# Patient Record
Sex: FEMALE | Race: BLACK OR AFRICAN AMERICAN | NOT HISPANIC OR LATINO | Employment: FULL TIME | URBAN - METROPOLITAN AREA
[De-identification: names, ages, dates, MRNs, and addresses within clinical notes are randomized per-mention and may not be internally consistent; named-entity substitution may affect disease eponyms.]

---

## 2017-12-02 ENCOUNTER — HOSPITAL ENCOUNTER (EMERGENCY)
Facility: HOSPITAL | Age: 37
Discharge: HOME | End: 2017-12-02
Attending: EMERGENCY MEDICINE
Payer: COMMERCIAL

## 2017-12-02 VITALS
TEMPERATURE: 99 F | BODY MASS INDEX: 36.8 KG/M2 | HEART RATE: 68 BPM | DIASTOLIC BLOOD PRESSURE: 81 MMHG | WEIGHT: 200 LBS | RESPIRATION RATE: 18 BRPM | OXYGEN SATURATION: 100 % | SYSTOLIC BLOOD PRESSURE: 131 MMHG | HEIGHT: 62 IN

## 2017-12-02 DIAGNOSIS — Z72.0 TOBACCO USER: ICD-10-CM

## 2017-12-02 DIAGNOSIS — M54.9 BACK PAIN, UNSPECIFIED BACK LOCATION, UNSPECIFIED BACK PAIN LATERALITY, UNSPECIFIED CHRONICITY: Primary | ICD-10-CM

## 2017-12-02 DIAGNOSIS — R03.0 ELEVATED BLOOD PRESSURE READING: ICD-10-CM

## 2017-12-02 PROCEDURE — 63600175 PHARM REV CODE 636 W HCPCS: Performed by: EMERGENCY MEDICINE

## 2017-12-02 PROCEDURE — 25000003 PHARM REV CODE 250: Performed by: EMERGENCY MEDICINE

## 2017-12-02 PROCEDURE — 99283 EMERGENCY DEPT VISIT LOW MDM: CPT | Mod: 25

## 2017-12-02 PROCEDURE — 96372 THER/PROPH/DIAG INJ SC/IM: CPT

## 2017-12-02 RX ORDER — CYCLOBENZAPRINE HCL 10 MG
10 TABLET ORAL
Status: COMPLETED | OUTPATIENT
Start: 2017-12-02 | End: 2017-12-02

## 2017-12-02 RX ORDER — KETOROLAC TROMETHAMINE 10 MG/1
10 TABLET, FILM COATED ORAL
Status: COMPLETED | OUTPATIENT
Start: 2017-12-02 | End: 2017-12-02

## 2017-12-02 RX ORDER — CYCLOBENZAPRINE HCL 10 MG
10 TABLET ORAL 3 TIMES DAILY PRN
Qty: 30 TABLET | Refills: 0 | OUTPATIENT
Start: 2017-12-02 | End: 2023-08-07

## 2017-12-02 RX ORDER — ONDANSETRON 4 MG/1
4 TABLET, ORALLY DISINTEGRATING ORAL
Status: COMPLETED | OUTPATIENT
Start: 2017-12-02 | End: 2017-12-02

## 2017-12-02 RX ORDER — MORPHINE SULFATE 2 MG/ML
4 INJECTION, SOLUTION INTRAMUSCULAR; INTRAVENOUS
Status: COMPLETED | OUTPATIENT
Start: 2017-12-02 | End: 2017-12-02

## 2017-12-02 RX ORDER — IBUPROFEN 600 MG/1
600 TABLET ORAL EVERY 6 HOURS PRN
Qty: 40 TABLET | Refills: 0 | OUTPATIENT
Start: 2017-12-02 | End: 2023-08-07

## 2017-12-02 RX ORDER — HYDROCODONE BITARTRATE AND ACETAMINOPHEN 7.5; 325 MG/1; MG/1
1 TABLET ORAL EVERY 6 HOURS PRN
Qty: 16 TABLET | Refills: 0 | OUTPATIENT
Start: 2017-12-02 | End: 2023-08-07

## 2017-12-02 RX ADMIN — ONDANSETRON 4 MG: 4 TABLET, ORALLY DISINTEGRATING ORAL at 08:12

## 2017-12-02 RX ADMIN — KETOROLAC TROMETHAMINE 10 MG: 10 TABLET, FILM COATED ORAL at 08:12

## 2017-12-02 RX ADMIN — CYCLOBENZAPRINE HYDROCHLORIDE 10 MG: 10 TABLET, FILM COATED ORAL at 08:12

## 2017-12-02 RX ADMIN — Medication 4 MG: at 08:12

## 2017-12-03 NOTE — DISCHARGE INSTRUCTIONS
Your blood pressure was elevated in the ED.  You may have high blood pressure.   Keep a log of your blood pressure and follow up with a Primary Care Provider within the next two weeks. Call [phone] for appointment.

## 2017-12-03 NOTE — ED NOTES
Pt is aaoX4, resp e/u, skin warm and dry, nad. Pt is aware of poc, and she denies having any further questions or concerns at this time. Pt will be discharged per md order.

## 2017-12-03 NOTE — ED PROVIDER NOTES
Encounter Date: 12/2/2017       History     Chief Complaint   Patient presents with    Back Pain     Mid lower back pain X2 days worsening tonight.      CHIEF COMPLIANT: Back Pain (Mid lower back pain X2 days worsening tonight. )       12/2/2017, 8:06 PM     The history is provided by the patient. Suzie Terrell is a 36 y.o. female presenting to the ED for low back pain.  Patient reports that she had picked up on 3 cases of water and placed in her car.  She states that she started having low back pain that was midline gradual in onset 2-3 days ago.  It is worsened by coughing, movement, laughing.  It is described as stabbing.  It is rated 10 out of 10.  There is no radiation of the pain.  Patient denies any IV drug use, dialysis, night sweats, unexpected weight loss, perirectal paresthesia, numbness or weakness to one side, abdominal pain, dysuria, urgency, or frequency or chest pain.  Prior treatment includes ibuprofen as well as baclofen.    PCP: Willard Geiger MD  Specialist:             Review of patient's allergies indicates:  No Known Allergies  Past Medical History:   Diagnosis Date    Arthritis      Past Surgical History:   Procedure Laterality Date    BONE CYST EXCISION  left wrist     History reviewed. No pertinent family history.  Social History   Substance Use Topics    Smoking status: Current Some Day Smoker    Smokeless tobacco: Not on file    Alcohol use No     Review of Systems   Constitutional: Negative for fever.   HENT: Negative for sore throat.    Respiratory: Negative for shortness of breath.    Cardiovascular: Negative for chest pain.   Gastrointestinal: Negative for nausea.   Genitourinary: Negative for dysuria, flank pain, hematuria and urgency.   Musculoskeletal: Negative for gait problem, joint swelling and neck stiffness.   Skin: Negative for rash.   Neurological: Negative for weakness.   Hematological: Does not bruise/bleed easily.       Physical Exam     Initial Vitals  "[12/02/17 1944]   BP Pulse Resp Temp SpO2   (!) 134/96 75 20 98.8 °F (37.1 °C) 100 %      MAP       108.67         Vitals:    12/02/17 1944   BP: (!) 134/96   Pulse: 75   Resp: 20   Temp: 98.8 °F (37.1 °C)   TempSrc: Oral   SpO2: 100%   Weight: 90.7 kg (200 lb)   Height: 5' 2" (1.575 m)         Physical Exam    Nursing note and vitals reviewed.  Constitutional: She appears well-developed and well-nourished.   HENT:   Head: Normocephalic and atraumatic.   Eyes: Conjunctivae and EOM are normal. Pupils are equal, round, and reactive to light.   Neck: Normal range of motion.   Cardiovascular: Normal rate, regular rhythm and normal heart sounds.   Pulmonary/Chest: Breath sounds normal. No respiratory distress.   Abdominal: Soft. Bowel sounds are normal. She exhibits no mass. There is no rebound and no guarding.   Musculoskeletal:        Thoracic back: She exhibits no tenderness, no bony tenderness and no deformity.        Lumbar back: She exhibits decreased range of motion, pain and spasm. She exhibits no tenderness and no bony tenderness.        Back:    Sensation intact and toes.  Cap refill less than 2 seconds.    Plantar flexion/dorsiflexion +5 out of 5, equal bilaterally.  Knee extension and knee flexion +5 out of 5 equal bilaterally.  Hip abduction/adduction +5 out of 5 equal bilaterally.  Hip extension/hip flexion +5 out of 5 equal bilaterally.    Negative straight leg raise bilaterally.   Neurological: She is alert and oriented to person, place, and time. She has normal strength. No cranial nerve deficit.   Cranial nerves II-XII intact   Skin: Skin is warm and dry.   Psychiatric: She has a normal mood and affect. Her speech is normal and behavior is normal. Thought content normal.         ED Course   Procedures  Labs Reviewed - No data to display                            ED Course      Medications   ketorolac tablet 10 mg (10 mg Oral Given 12/2/17 2033)   cyclobenzaprine tablet 10 mg (10 mg Oral Given 12/2/17 " 2034)   ondansetron disintegrating tablet 4 mg (4 mg Oral Given 12/2/17 2034)   morphine injection 4 mg (4 mg Intramuscular Given 12/2/17 2034)       9:17 PM Reassessment: Dr. Rockwell reassessed the pt.  The pt is resting comfortably and is NAD.  Pt states their sx have improved. Discussed test results, shared treatment plan, specific conditions for return, and the need for f/u.  Answered their questions at this time.  Pt understands and agrees to the plan.  The pt has remained hemodynamically stable through ED course and is stable for discharge.     Follow-up Information     Willard Geiger MD. Schedule an appointment as soon as possible for a visit in 2 days.    Specialty:  Family Medicine  Why:  Warm compresses to back.  Return to the ED for:  loss of control of bowel/bladder, new numbness/weakness of the legs, abdominal pain, night sweats or other concerns.  Contact information:  [address] [facility] [id number] [phone]                     New Prescriptions    CYCLOBENZAPRINE (FLEXERIL) 10 MG TABLET    Take 1 tablet (10 mg total) by mouth 3 (three) times daily as needed for Muscle spasms.    HYDROCODONE-ACETAMINOPHEN 7.5-325MG (NORCO) 7.5-325 MG PER TABLET    Take 1 tablet by mouth every 6 (six) hours as needed.    IBUPROFEN (ADVIL,MOTRIN) 600 MG TABLET    Take 1 tablet (600 mg total) by mouth every 6 (six) hours as needed.        Discontinued Medications    HYDROCODONE-ACETAMINOPHEN 5-325MG (NORCO) 5-325 MG PER TABLET    Take 1 tablet by mouth every 6 (six) hours as needed for Pain.    NAPROXEN (NAPROSYN) 500 MG TABLET    Take 1 tablet (500 mg total) by mouth 2 (two) times daily with meals.         Pre-hypertension/Hypertension: The pt has been informed that they may have pre-hypertension or hypertension based on a blood pressure reading in the ED. I recommend that the pt call the PCP listed on their discharge instructions or a physician of their choice this week to arrange  f/u for further evaluation of possible pre-hypertension or hypertension.     Clinical Impression:       ICD-10-CM ICD-9-CM   1. Back pain, unspecified back location, unspecified back pain laterality, unspecified chronicity M54.9 724.5   2. Elevated blood pressure reading R03.0 796.2   3. Tobacco user Z72.0 305.1         Disposition:   Disposition: Discharged  Condition: Stable                        Pema Rockwell, DO  12/02/17 2629

## 2020-03-11 PROBLEM — M19.90 ARTHRITIS: Status: ACTIVE | Noted: 2020-03-11

## 2020-03-11 PROBLEM — R35.89 FREQUENCY OF URINATION AND POLYURIA: Status: ACTIVE | Noted: 2020-03-11

## 2020-03-11 PROBLEM — R35.0 FREQUENCY OF URINATION AND POLYURIA: Status: ACTIVE | Noted: 2020-03-11

## 2023-08-07 ENCOUNTER — HOSPITAL ENCOUNTER (EMERGENCY)
Facility: HOSPITAL | Age: 43
Discharge: HOME | End: 2023-08-07
Attending: EMERGENCY MEDICINE
Payer: COMMERCIAL

## 2023-08-07 VITALS
SYSTOLIC BLOOD PRESSURE: 133 MMHG | DIASTOLIC BLOOD PRESSURE: 80 MMHG | WEIGHT: 210 LBS | HEART RATE: 74 BPM | RESPIRATION RATE: 18 BRPM | BODY MASS INDEX: 38.41 KG/M2 | OXYGEN SATURATION: 99 %

## 2023-08-07 DIAGNOSIS — R06.02 SOB (SHORTNESS OF BREATH): ICD-10-CM

## 2023-08-07 DIAGNOSIS — N39.0 ACUTE UTI (URINARY TRACT INFECTION): ICD-10-CM

## 2023-08-07 DIAGNOSIS — F41.0 ANXIETY ATTACK: ICD-10-CM

## 2023-08-07 DIAGNOSIS — U07.1 COVID-19 VIRUS INFECTION: Primary | ICD-10-CM

## 2023-08-07 LAB
ALBUMIN SERPL BCP-MCNC: 3.8 G/DL (ref 3.5–5.2)
ALP SERPL-CCNC: 69 U/L (ref 55–135)
ALT SERPL W/O P-5'-P-CCNC: 27 U/L (ref 10–44)
ANION GAP SERPL CALC-SCNC: 15 MMOL/L (ref 8–16)
AST SERPL-CCNC: 34 U/L (ref 10–40)
B-HCG UR QL: NEGATIVE
BACTERIA #/AREA URNS AUTO: ABNORMAL /HPF
BASOPHILS # BLD AUTO: 0.03 K/UL (ref 0–0.2)
BASOPHILS NFR BLD: 0.5 % (ref 0–1.9)
BILIRUB SERPL-MCNC: 0.3 MG/DL (ref 0.1–1)
BILIRUB UR QL STRIP: NEGATIVE
BNP SERPL-MCNC: <10 PG/ML (ref 0–99)
BUN SERPL-MCNC: 13 MG/DL (ref 6–20)
CALCIUM SERPL-MCNC: 8.8 MG/DL (ref 8.7–10.5)
CHLORIDE SERPL-SCNC: 105 MMOL/L (ref 95–110)
CLARITY UR REFRACT.AUTO: ABNORMAL
CO2 SERPL-SCNC: 18 MMOL/L (ref 23–29)
COLOR UR AUTO: YELLOW
CREAT SERPL-MCNC: 1 MG/DL (ref 0.5–1.4)
D DIMER PPP IA.FEU-MCNC: 0.51 MG/L FEU
DIFFERENTIAL METHOD: ABNORMAL
EOSINOPHIL # BLD AUTO: 0.1 K/UL (ref 0–0.5)
EOSINOPHIL NFR BLD: 1.7 % (ref 0–8)
ERYTHROCYTE [DISTWIDTH] IN BLOOD BY AUTOMATED COUNT: 16.7 % (ref 11.5–14.5)
EST. GFR  (NO RACE VARIABLE): >60 ML/MIN/1.73 M^2
GLUCOSE SERPL-MCNC: 89 MG/DL (ref 70–110)
GLUCOSE UR QL STRIP: NEGATIVE
HCT VFR BLD AUTO: 34.2 % (ref 37–48.5)
HGB BLD-MCNC: 11.2 G/DL (ref 12–16)
HGB UR QL STRIP: NEGATIVE
HYALINE CASTS UR QL AUTO: 8 /LPF
IMM GRANULOCYTES # BLD AUTO: 0.01 K/UL (ref 0–0.04)
IMM GRANULOCYTES NFR BLD AUTO: 0.2 % (ref 0–0.5)
KETONES UR QL STRIP: ABNORMAL
LEUKOCYTE ESTERASE UR QL STRIP: ABNORMAL
LYMPHOCYTES # BLD AUTO: 2.4 K/UL (ref 1–4.8)
LYMPHOCYTES NFR BLD: 38.4 % (ref 18–48)
MCH RBC QN AUTO: 24.3 PG (ref 27–31)
MCHC RBC AUTO-ENTMCNC: 32.7 G/DL (ref 32–36)
MCV RBC AUTO: 74 FL (ref 82–98)
MICROSCOPIC COMMENT: ABNORMAL
MONOCYTES # BLD AUTO: 0.7 K/UL (ref 0.3–1)
MONOCYTES NFR BLD: 10.6 % (ref 4–15)
NEUTROPHILS # BLD AUTO: 3.1 K/UL (ref 1.8–7.7)
NEUTROPHILS NFR BLD: 48.6 % (ref 38–73)
NITRITE UR QL STRIP: NEGATIVE
NRBC BLD-RTO: 0 /100 WBC
PH UR STRIP: 6 [PH] (ref 5–8)
PLATELET # BLD AUTO: 349 K/UL (ref 150–450)
PMV BLD AUTO: 8.8 FL (ref 9.2–12.9)
POTASSIUM SERPL-SCNC: 3.4 MMOL/L (ref 3.5–5.1)
PROT SERPL-MCNC: 8.2 G/DL (ref 6–8.4)
PROT UR QL STRIP: ABNORMAL
RBC # BLD AUTO: 4.61 M/UL (ref 4–5.4)
RBC #/AREA URNS AUTO: 7 /HPF (ref 0–4)
SODIUM SERPL-SCNC: 138 MMOL/L (ref 136–145)
SP GR UR STRIP: 1.02 (ref 1–1.03)
SQUAMOUS #/AREA URNS AUTO: 15 /HPF
TROPONIN I SERPL DL<=0.01 NG/ML-MCNC: 0.01 NG/ML (ref 0–0.03)
URN SPEC COLLECT METH UR: ABNORMAL
UROBILINOGEN UR STRIP-ACNC: <2 EU/DL
WBC # BLD AUTO: 6.33 K/UL (ref 3.9–12.7)
WBC #/AREA URNS AUTO: 18 /HPF (ref 0–5)

## 2023-08-07 PROCEDURE — 83880 ASSAY OF NATRIURETIC PEPTIDE: CPT | Mod: ER | Performed by: EMERGENCY MEDICINE

## 2023-08-07 PROCEDURE — 99285 EMERGENCY DEPT VISIT HI MDM: CPT | Mod: 25,ER

## 2023-08-07 PROCEDURE — 80053 COMPREHEN METABOLIC PANEL: CPT | Mod: ER | Performed by: EMERGENCY MEDICINE

## 2023-08-07 PROCEDURE — 63600175 PHARM REV CODE 636 W HCPCS: Mod: ER | Performed by: EMERGENCY MEDICINE

## 2023-08-07 PROCEDURE — 84484 ASSAY OF TROPONIN QUANT: CPT | Mod: ER | Performed by: EMERGENCY MEDICINE

## 2023-08-07 PROCEDURE — 93010 ELECTROCARDIOGRAM REPORT: CPT | Mod: ,,, | Performed by: INTERNAL MEDICINE

## 2023-08-07 PROCEDURE — 96374 THER/PROPH/DIAG INJ IV PUSH: CPT | Mod: 59,ER

## 2023-08-07 PROCEDURE — 81000 URINALYSIS NONAUTO W/SCOPE: CPT | Mod: ER | Performed by: EMERGENCY MEDICINE

## 2023-08-07 PROCEDURE — 93010 EKG 12-LEAD: ICD-10-PCS | Mod: ,,, | Performed by: INTERNAL MEDICINE

## 2023-08-07 PROCEDURE — 25500020 PHARM REV CODE 255: Mod: ER | Performed by: EMERGENCY MEDICINE

## 2023-08-07 PROCEDURE — 81025 URINE PREGNANCY TEST: CPT | Mod: ER | Performed by: EMERGENCY MEDICINE

## 2023-08-07 PROCEDURE — 85025 COMPLETE CBC W/AUTO DIFF WBC: CPT | Mod: ER | Performed by: EMERGENCY MEDICINE

## 2023-08-07 PROCEDURE — 93005 ELECTROCARDIOGRAM TRACING: CPT | Mod: ER

## 2023-08-07 PROCEDURE — 87086 URINE CULTURE/COLONY COUNT: CPT | Performed by: EMERGENCY MEDICINE

## 2023-08-07 PROCEDURE — 85379 FIBRIN DEGRADATION QUANT: CPT | Mod: ER | Performed by: EMERGENCY MEDICINE

## 2023-08-07 RX ORDER — LORAZEPAM 2 MG/ML
1 INJECTION INTRAMUSCULAR
Status: COMPLETED | OUTPATIENT
Start: 2023-08-07 | End: 2023-08-07

## 2023-08-07 RX ORDER — HYDROXYZINE HYDROCHLORIDE 25 MG/1
25 TABLET, FILM COATED ORAL 3 TIMES DAILY PRN
Qty: 15 TABLET | Refills: 0 | Status: SHIPPED | OUTPATIENT
Start: 2023-08-07 | End: 2023-08-14

## 2023-08-07 RX ORDER — CEFDINIR 300 MG/1
300 CAPSULE ORAL 2 TIMES DAILY
Qty: 6 CAPSULE | Refills: 0 | Status: SHIPPED | OUTPATIENT
Start: 2023-08-07 | End: 2023-08-10

## 2023-08-07 RX ORDER — FLUCONAZOLE 150 MG/1
150 TABLET ORAL ONCE AS NEEDED
Qty: 1 TABLET | Refills: 0 | Status: SHIPPED | OUTPATIENT
Start: 2023-08-07 | End: 2023-08-07

## 2023-08-07 RX ADMIN — LORAZEPAM 1 MG: 2 INJECTION INTRAMUSCULAR; INTRAVENOUS at 08:08

## 2023-08-07 RX ADMIN — IOHEXOL 100 ML: 350 INJECTION, SOLUTION INTRAVENOUS at 10:08

## 2023-08-08 NOTE — ED PROVIDER NOTES
History     Chief Complaint   Patient presents with    Shortness of Breath     Dx covid on Thursday      HPI:  Suzie Terrell is a 42 y.o. female with PMH as below who presents to the [facility] emergency department for evaluation of sudden onsets, severe SOB at store just prior to arrival in the setting of being diagnosed w/ COVID-19 on Thursday.       PCP: Willard Geiger MD (Inactive)    Review of patient's allergies indicates:  No Known Allergies   Past Medical History:   Diagnosis Date    Anxiety     Arthritis      Past Surgical History:   Procedure Laterality Date    BONE CYST EXCISION  left wrist       Family History   Problem Relation Age of Onset    Cancer Mother     Cancer Sister      Social History     Tobacco Use    Smoking status: Some Days     Current packs/day: 0.00    Smokeless tobacco: Never   Substance and Sexual Activity    Alcohol use: No    Drug use: No    Sexual activity: Not on file      Review of Systems     Review of Systems   Constitutional: Negative.  Negative for fever.   HENT: Negative.     Eyes: Negative.    Respiratory:  Positive for cough and shortness of breath.    Cardiovascular: Negative.    Gastrointestinal: Negative.    Endocrine: Negative.    Genitourinary: Negative.    Musculoskeletal: Negative.    Skin: Negative.    Allergic/Immunologic: Negative.    Neurological: Negative.    Hematological: Negative.    Psychiatric/Behavioral: Negative.     All other systems reviewed and are negative.       Physical Exam     Initial Vitals [08/07/23 1958]   BP Pulse Resp Temp SpO2   129/70 84 (!) 38 -- 100 %      MAP       --          Nursing notes and vital signs reviewed.  Constitutional: Patient is in severe distress.   Head: Normocephalic. Atraumatic.   Eyes:  Conjunctivae are not pale. No scleral icterus.   ENT: Mucous membranes moist.   Neck: Supple.   Cardiovascular: Regular rate. Regular rhythm.   Pulmonary: Marked tachypnea with clear breath sounds, no w/r/r, and good  air exchange.   Abdominal: Non-distended.   Musculoskeletal: Moves all extremities. No obvious deformities.   Skin: Warm and dry.   Neurological:  Alert, awake. No acute lateralizing neurologic deficits appreciated.   Psychiatric: Very anxious.      ED Course   Procedures  Vitals:    08/07/23 1958 08/07/23 2018 08/07/23 2032 08/07/23 2319   BP: 129/70 132/76 (!) 145/79 133/80   Pulse: 84 77 74 74   Resp: (!) 38 (!) 35 (!) 22 18   TempSrc: Oral      SpO2: 100% 100% 98% 99%   Weight: 95.3 kg (210 lb)        Lab Results Interpreted as Abnormal:  Labs Reviewed   CBC W/ AUTO DIFFERENTIAL - Abnormal; Notable for the following components:       Result Value    Hemoglobin 11.2 (*)     Hematocrit 34.2 (*)     MCV 74 (*)     MCH 24.3 (*)     RDW 16.7 (*)     MPV 8.8 (*)     All other components within normal limits   COMPREHENSIVE METABOLIC PANEL - Abnormal; Notable for the following components:    Potassium 3.4 (*)     CO2 18 (*)     All other components within normal limits   D DIMER, QUANTITATIVE - Abnormal; Notable for the following components:    D-Dimer 0.51 (*)     All other components within normal limits   URINALYSIS, REFLEX TO URINE CULTURE - Abnormal; Notable for the following components:    Appearance, UA Hazy (*)     Protein, UA Trace (*)     Ketones, UA 1+ (*)     Leukocytes, UA 1+ (*)     All other components within normal limits    Narrative:     Specimen Source->Urine   URINALYSIS MICROSCOPIC - Abnormal; Notable for the following components:    RBC, UA 7 (*)     WBC, UA 18 (*)     Bacteria Moderate (*)     Hyaline Casts, UA 8 (*)     All other components within normal limits    Narrative:     Specimen Source->Urine   CULTURE, URINE   TROPONIN I   B-TYPE NATRIURETIC PEPTIDE   PREGNANCY TEST, URINE RAPID    Narrative:     Specimen Source->Urine      All Lab Results:  Results for orders placed or performed during the hospital encounter of 08/07/23   CBC auto differential   Result Value Ref Range    WBC 6.33 3.90  - 12.70 K/uL    RBC 4.61 4.00 - 5.40 M/uL    Hemoglobin 11.2 (L) 12.0 - 16.0 g/dL    Hematocrit 34.2 (L) 37.0 - 48.5 %    MCV 74 (L) 82 - 98 fL    MCH 24.3 (L) 27.0 - 31.0 pg    MCHC 32.7 32.0 - 36.0 g/dL    RDW 16.7 (H) 11.5 - 14.5 %    Platelets 349 150 - 450 K/uL    MPV 8.8 (L) 9.2 - 12.9 fL    Immature Granulocytes 0.2 0.0 - 0.5 %    Gran # (ANC) 3.1 1.8 - 7.7 K/uL    Immature Grans (Abs) 0.01 0.00 - 0.04 K/uL    Lymph # 2.4 1.0 - 4.8 K/uL    Mono # 0.7 0.3 - 1.0 K/uL    Eos # 0.1 0.0 - 0.5 K/uL    Baso # 0.03 0.00 - 0.20 K/uL    nRBC 0 0 /100 WBC    Gran % 48.6 38.0 - 73.0 %    Lymph % 38.4 18.0 - 48.0 %    Mono % 10.6 4.0 - 15.0 %    Eosinophil % 1.7 0.0 - 8.0 %    Basophil % 0.5 0.0 - 1.9 %    Differential Method Automated    Comprehensive metabolic panel   Result Value Ref Range    Sodium 138 136 - 145 mmol/L    Potassium 3.4 (L) 3.5 - 5.1 mmol/L    Chloride 105 95 - 110 mmol/L    CO2 18 (L) 23 - 29 mmol/L    Glucose 89 70 - 110 mg/dL    BUN 13 6 - 20 mg/dL    Creatinine 1.0 0.5 - 1.4 mg/dL    Calcium 8.8 8.7 - 10.5 mg/dL    Total Protein 8.2 6.0 - 8.4 g/dL    Albumin 3.8 3.5 - 5.2 g/dL    Total Bilirubin 0.3 0.1 - 1.0 mg/dL    Alkaline Phosphatase 69 55 - 135 U/L    AST 34 10 - 40 U/L    ALT 27 10 - 44 U/L    eGFR >60.0 >60 mL/min/1.73 m^2    Anion Gap 15 8 - 16 mmol/L   Troponin I   Result Value Ref Range    Troponin I 0.007 0.000 - 0.026 ng/mL   D dimer, quantitative   Result Value Ref Range    D-Dimer 0.51 (H) <0.50 mg/L FEU   Brain natriuretic peptide   Result Value Ref Range    BNP <10 0 - 99 pg/mL   Pregnancy, urine rapid (UPT)   Result Value Ref Range    Preg Test, Ur Negative    Urinalysis, Reflex to Urine Culture Urine, Clean Catch    Specimen: Urine   Result Value Ref Range    Specimen UA Urine, Clean Catch     Color, UA Yellow Yellow, Straw, Brittney    Appearance, UA Hazy (A) Clear    pH, UA 6.0 5.0 - 8.0    Specific Gravity, UA 1.025 1.005 - 1.030    Protein, UA Trace (A) Negative    Glucose, UA  Negative Negative    Ketones, UA 1+ (A) Negative    Bilirubin (UA) Negative Negative    Occult Blood UA Negative Negative    Nitrite, UA Negative Negative    Urobilinogen, UA <2.0 <2.0 EU/dL    Leukocytes, UA 1+ (A) Negative   Urinalysis Microscopic   Result Value Ref Range    RBC, UA 7 (H) 0 - 4 /hpf    WBC, UA 18 (H) 0 - 5 /hpf    Bacteria Moderate (A) None-Occ /hpf    Squam Epithel, UA 15 /hpf    Hyaline Casts, UA 8 (A) 0-1/lpf /lpf    Microscopic Comment SEE COMMENT      Imaging Results               CTA Chest Non-Coronary (PE Studies) (Final result)  Result time 08/07/23 22:30:33      Final result by London Jones MD (08/07/23 22:30:33)                   Impression:      No pulmonary embolism.    Subsolid peripheral pulmonary opacities consistent with COVID-19 pneumonia but overall nonspecific.    This report was flagged in Epic as abnormal.    All CT scans at this facility are performed  using dose modulation techniques as appropriate to performed exam including the following:  automated exposure control; adjustment of mA and/or kV according to the patients size (this includes techniques or standardized protocols for targeted exams where dose is matched to indication/reason for exam: i.e. extremities or head);  iterative reconstruction technique.      Electronically signed by: London Jones  Date:    08/07/2023  Time:    22:30               Narrative:    EXAMINATION:  CTA CHEST NON CORONARY (PE STUDIES)    CLINICAL HISTORY:  Pulmonary embolism (PE) suspected, positive D-dimer;    TECHNIQUE:  Low dose axial images, sagittal and coronal reformations were obtained from the thoracic inlet to the lung bases following the IV administration of 100 mL of Omnipaque 350.  Contrast timing was optimized to evaluate the pulmonary arteries.  MIP images were performed.    COMPARISON:  None    FINDINGS:  Base of Neck: No significant abnormality.    Thoracic soft tissues: Unremarkable.    Aorta: Left-sided aortic arch.   No aneurysm and no significant atherosclerosis    Heart: Normal size. No effusion.    Pulmonary vasculature: Pulmonary arteries are well opacified.  There is no pulmonary thromboembolism.  Evaluation is adequate to the level of the segmental pulmonary arteries.    Marycarmen/Mediastinum: No pathologic beena enlargement.    Airways: Patent.    Lungs/Pleura: Small peripheral subsolid predominant opacities possibly related to early pneumonia or viral pneumonia.  Correlation is advised.  Noninfectious inflammation could appear similarly.    Esophagus: Unremarkable.    Upper Abdomen: No abnormality of the partially imaged upper abdomen.    Bones: No acute fracture. No suspicious lytic or sclerotic lesions.                                       X-Ray Chest AP Portable (Final result)  Result time 08/07/23 22:33:44      Final result by London Jones MD (08/07/23 22:33:44)                   Impression:      No acute abnormality.      Electronically signed by: London Jones  Date:    08/07/2023  Time:    22:33               Narrative:    EXAMINATION:  XR CHEST AP PORTABLE    CLINICAL HISTORY:  COVID-19    TECHNIQUE:  Single frontal view of the chest was performed.    COMPARISON:  None    FINDINGS:  The lungs are clear, with normal appearance of pulmonary vasculature and no pleural effusion or pneumothorax.    The cardiac silhouette is normal in size. The hilar and mediastinal contours are unremarkable.    Bones are intact.                                     ED Physician's independent review of the above imaging: agree with radiologist, no PE.    The EKG was ordered, reviewed, and independently interpreted by the ED Physician:  Rhythm: normal sinus  Rate: 76 bpm  No ST-T changes concerning for acute ischemia  Normal axis.   Normal intervals.      The emergency physician reviewed the vital signs and test results, which are outlined above.     ED Discussion     Patient's evaluation in the ED does not suggest any emergent or  life-threatening medical conditions requiring immediate intervention beyond what was provided in the ED, and I believe patient is safe for discharge. Regardless, an unremarkable evaluation in the ED does not preclude the development or presence of a serious or life-threatening condition. As such, patient was given return instructions for any change or worsening of symptoms.           ED Medication(s) Administered:  Medications   LORazepam injection 1 mg (1 mg Intravenous Given 8/7/23 2007)   iohexoL (OMNIPAQUE 350) injection 100 mL (100 mLs Intravenous Given 8/7/23 2216)       Prescription Management: I performed a review of the patient's current Rx medication list as input by nursing staff.    Discharge Medication List as of 8/7/2023 10:48 PM        START taking these medications    Details   cefdinir (OMNICEF) 300 MG capsule Take 1 capsule (300 mg total) by mouth 2 (two) times daily. for 3 days, Starting Mon 8/7/2023, Until Thu 8/10/2023, Normal      hydrOXYzine HCL (ATARAX) 25 MG tablet Take 1 tablet (25 mg total) by mouth 3 (three) times daily as needed for Anxiety., Starting Mon 8/7/2023, Until Mon 8/14/2023 at 2359, Normal           CONTINUE these medications which have CHANGED    Details   fluconazole (DIFLUCAN) 150 MG Tab Take 1 tablet (150 mg total) by mouth once as needed (yeast symptoms)., Starting Mon 8/7/2023, Until Mon 8/7/2023 at 2359, Normal           CONTINUE these medications which have NOT CHANGED    Details   ALAHIST CF 2-10-20 mg Tab Take 1 tablet by mouth once daily., Starting Sat 12/28/2019, Historical Med      cholecalciferol, vitamin D3, 5,000 unit TbDL Take 1 each by mouth once daily., Starting Sat 3/14/2020, Normal      iron-vit c-vit b12-folic acid (IRON-C PLUS) tablet Take 1 tablet by mouth Daily., Starting Wed 3/11/2020, Normal      ondansetron (ZOFRAN) 8 MG tablet TK 1 T PO QD FOR 7 DAYS PRN, Historical Med      PROAIR HFA 90 mcg/actuation inhaler INL 2 PFS PO Q 6 H PRN, Historical Med            STOP taking these medications       amoxicillin-clavulanate 875-125mg (AUGMENTIN) 875-125 mg per tablet Comments:   Reason for Stopping:         cyclobenzaprine (FLEXERIL) 10 MG tablet Comments:   Reason for Stopping:         hydrocodone-acetaminophen 7.5-325mg (NORCO) 7.5-325 mg per tablet Comments:   Reason for Stopping:         ibuprofen (ADVIL,MOTRIN) 600 MG tablet Comments:   Reason for Stopping:         oseltamivir (TAMIFLU) 75 MG capsule Comments:   Reason for Stopping:         predniSONE (DELTASONE) 20 MG tablet Comments:   Reason for Stopping:         PREPARATION H MAXIMUM STRENGTH 0.25-1 % Crea Comments:   Reason for Stopping:                 Follow-up Information       Willard Geiger MD. Schedule an appointment as soon as possible for a visit in 1 week.    Specialty: Family Medicine  Contact information:  [address] [facility] [id number] [phone]               [facility] - Emergency Dept.    Specialty: Emergency Medicine  Why: As needed, If symptoms worsen  Contact information:  [address]  [facility] [address] [phone]                          Clinical Impression       ICD-10-CM ICD-9-CM   1. COVID-19 virus infection  U07.1 079.89   2. SOB (shortness of breath)  R06.02 786.05   3. Anxiety attack  F41.0 300.01   4. Acute UTI (urinary tract infection)  N39.0 599.0      ED Disposition Condition    Discharge Stable             Atilio Reyes MD  08/08/23 1432

## 2023-08-09 LAB
BACTERIA UR CULT: NORMAL
BACTERIA UR CULT: NORMAL

## 2024-08-01 NOTE — PROGRESS NOTES
Patient ID: Suzie Terrell is a 43 y.o. female.    Chief Complaint: Establish Care      HPI  Patient presents to establish care. She reports her previous PCP retired. She states she had lab work in April, A1c was 6.8 at that time. She states the NP who took over the practice refused to prescribe medication for diabetes. She reports leg swelling that developed after sahra COVID.    Age related screenings:  Mammogram: Due  Pap smear: 4/27/2023      Review of Systems   Constitutional:  Negative for chills and fatigue.   Respiratory:  Negative for chest tightness and shortness of breath.    Cardiovascular:  Positive for leg swelling. Negative for chest pain and palpitations.   Gastrointestinal:  Negative for abdominal pain, constipation, diarrhea, nausea and vomiting.   Genitourinary:  Negative for frequency and urgency.   Neurological:  Negative for dizziness, numbness and headaches.          Objective       Current Outpatient Medications:     calcium-vitamin D 250 mg-2.5 mcg (100 unit) per tablet, Take 1 tablet by mouth 2 (two) times daily., Disp: , Rfl:     ferrous gluconate (FERGON) 324 MG tablet, Take 324 mg by mouth daily with breakfast., Disp: , Rfl:     PROAIR HFA 90 mcg/actuation inhaler, INL 2 PFS PO Q 6 H PRN, Disp: , Rfl:     tirzepatide (MOUNJARO) 2.5 mg/0.5 mL PnIj, Inject 2.5 mg into the skin every 7 days., Disp: 4 Pen, Rfl: 11     Physical Exam  Constitutional:       General: She is not in acute distress.     Appearance: Normal appearance. She is obese.   HENT:      Head: Normocephalic and atraumatic.   Cardiovascular:      Rate and Rhythm: Normal rate and regular rhythm.      Heart sounds: Normal heart sounds. No murmur heard.     No friction rub. No gallop.   Pulmonary:      Effort: Pulmonary effort is normal.      Breath sounds: Normal breath sounds. No wheezing, rhonchi or rales.   Abdominal:      General: Bowel sounds are normal. There is no distension.      Palpations: Abdomen is soft.  "     Tenderness: There is no abdominal tenderness. There is no rebound.   Skin:     General: Skin is warm and dry.      Coloration: Skin is not jaundiced.      Comments: Lipoma bilateral ankles   Neurological:      General: No focal deficit present.      Mental Status: She is alert and oriented to person, place, and time. Mental status is at baseline.   Psychiatric:         Mood and Affect: Mood normal.         Behavior: Behavior normal.              VITAL SIGNS:  Vitals:    08/02/24 0938   BP: 130/84   Pulse: 70   Resp: 18   Temp: 98.2 °F (36.8 °C)   Weight: 113 kg (249 lb 1.9 oz)   Height: 5' 2" (1.575 m)       CURRENT BMI:   Body mass index is 45.56 kg/m².    LABS REVIEWED:    CBC:  Lab Results   Component Value Date    WBC 6.33 08/07/2023    RBC 4.61 08/07/2023    HGB 11.2 (L) 08/07/2023    HCT 34.2 (L) 08/07/2023    MCV 74 (L) 08/07/2023    MCH 24.3 (L) 08/07/2023    MCHC 32.7 08/07/2023    RDW 16.7 (H) 08/07/2023     08/07/2023    MPV 8.8 (L) 08/07/2023    GRAN 3.1 08/07/2023    GRAN 48.6 08/07/2023    LYMPH 2.4 08/07/2023    LYMPH 38.4 08/07/2023    MONO 0.7 08/07/2023    MONO 10.6 08/07/2023    EOS 0.1 08/07/2023    BASO 0.03 08/07/2023    EOSINOPHIL 1.7 08/07/2023    BASOPHIL 0.5 08/07/2023       CHEMISTRY:  Sodium   Date Value Ref Range Status   08/07/2023 138 136 - 145 mmol/L Final     Potassium   Date Value Ref Range Status   08/07/2023 3.4 (L) 3.5 - 5.1 mmol/L Final     Chloride   Date Value Ref Range Status   08/07/2023 105 95 - 110 mmol/L Final     CO2   Date Value Ref Range Status   08/07/2023 18 (L) 23 - 29 mmol/L Final     Glucose   Date Value Ref Range Status   08/07/2023 89 70 - 110 mg/dL Final     BUN   Date Value Ref Range Status   08/07/2023 13 6 - 20 mg/dL Final     Creatinine   Date Value Ref Range Status   08/07/2023 1.0 0.5 - 1.4 mg/dL Final     Calcium   Date Value Ref Range Status   08/07/2023 8.8 8.7 - 10.5 mg/dL Final     Total Protein   Date Value Ref Range Status   08/07/2023 " "8.2 6.0 - 8.4 g/dL Final     Albumin   Date Value Ref Range Status   08/07/2023 3.8 3.5 - 5.2 g/dL Final     Total Bilirubin   Date Value Ref Range Status   08/07/2023 0.3 0.1 - 1.0 mg/dL Final     Comment:     For infants and newborns, interpretation of results should be based  on gestational age, weight and in agreement with clinical  observations.    Premature Infant recommended reference ranges:  Up to 24 hours.............<8.0 mg/dL  Up to 48 hours............<12.0 mg/dL  3-5 days..................<15.0 mg/dL  6-29 days.................<15.0 mg/dL       Alkaline Phosphatase   Date Value Ref Range Status   08/07/2023 69 55 - 135 U/L Final     AST   Date Value Ref Range Status   08/07/2023 34 10 - 40 U/L Final     ALT   Date Value Ref Range Status   08/07/2023 27 10 - 44 U/L Final     Anion Gap   Date Value Ref Range Status   08/07/2023 15 8 - 16 mmol/L Final     eGFR   Date Value Ref Range Status   08/07/2023 >60.0 >60 mL/min/1.73 m^2 Final       LIPID PANEL:  Lab Results   Component Value Date    CHOL 140 03/11/2020     Lab Results   Component Value Date    TRIG 92 03/11/2020     Lab Results   Component Value Date    HDL 51 03/11/2020     Lab Results   Component Value Date    LDLCALC 71 03/11/2020       THYROID:  Lab Results   Component Value Date    TSH 1.390 03/11/2020       DIABETES:  No results found for: "LABA1C", "HGBA1C"  No results found for: "MICALBCREAT"    Assessment and Plan     1. Type 2 diabetes mellitus without complication, without long-term current use of insulin  Comments:  Will start patient on Mounjaro at patient request for weight loss benefit as well as glucose control.  Orders:  -     tirzepatide (MOUNJARO) 2.5 mg/0.5 mL PnIj; Inject 2.5 mg into the skin every 7 days.  Dispense: 4 Pen; Refill: 11    2. Encounter for screening mammogram for malignant neoplasm of breast  Comments:  Will order mammogram  Orders:  -     Mammo Digital Screening Bilat w/ Kevan; Future; Expected date: " 08/02/2024    3. Leg swelling  Comments:  Will prescribe compression stockings  Orders:  -     COMPRESSION STOCKINGS    4. Lipoma, unspecified site  Comments:  benign, no concerns.      38 of total time spent on the encounter, which includes face to face time and non-face to face time preparing to see the patient. This includes obtaining and/or reviewing separately obtained history, performing a medically appropriate examination and/or evaluation, and counseling and educating the patient/family/caregiver. Includes documenting clinical information in the electronic or other health record, independently interpreting results (not separately reported) and communicating results to the patient/family/caregiver, with care coordination (not separately reported). Medications, tests and/or procedures ordered as necessary along with referring and communicating with other health professionals (when not separately reported).          Follow up in about 1 year (around 8/2/2025) for In person, Physical, with PCP.

## 2024-08-02 ENCOUNTER — OFFICE VISIT (OUTPATIENT)
Dept: FAMILY MEDICINE | Facility: CLINIC | Age: 44
End: 2024-08-02
Payer: COMMERCIAL

## 2024-08-02 ENCOUNTER — TELEPHONE (OUTPATIENT)
Dept: FAMILY MEDICINE | Facility: CLINIC | Age: 44
End: 2024-08-02

## 2024-08-02 VITALS
HEIGHT: 62 IN | HEART RATE: 70 BPM | DIASTOLIC BLOOD PRESSURE: 84 MMHG | SYSTOLIC BLOOD PRESSURE: 130 MMHG | TEMPERATURE: 98 F | WEIGHT: 249.13 LBS | BODY MASS INDEX: 45.84 KG/M2 | RESPIRATION RATE: 18 BRPM

## 2024-08-02 DIAGNOSIS — Z12.31 ENCOUNTER FOR SCREENING MAMMOGRAM FOR MALIGNANT NEOPLASM OF BREAST: ICD-10-CM

## 2024-08-02 DIAGNOSIS — D17.9 LIPOMA, UNSPECIFIED SITE: ICD-10-CM

## 2024-08-02 DIAGNOSIS — M79.89 LEG SWELLING: ICD-10-CM

## 2024-08-02 DIAGNOSIS — E11.9 TYPE 2 DIABETES MELLITUS WITHOUT COMPLICATION, WITHOUT LONG-TERM CURRENT USE OF INSULIN: Primary | ICD-10-CM

## 2024-08-02 DIAGNOSIS — E11.9 TYPE 2 DIABETES MELLITUS WITHOUT COMPLICATION, WITHOUT LONG-TERM CURRENT USE OF INSULIN: ICD-10-CM

## 2024-08-02 PROCEDURE — 99999 PR PBB SHADOW E&M-EST. PATIENT-LVL IV: CPT | Mod: PBBFAC,,, | Performed by: INTERNAL MEDICINE

## 2024-08-02 RX ORDER — FERROUS GLUCONATE 324(38)MG
324 TABLET ORAL
COMMUNITY

## 2024-08-02 RX ORDER — TIRZEPATIDE 2.5 MG/.5ML
2.5 INJECTION, SOLUTION SUBCUTANEOUS
Qty: 4 PEN | Refills: 11 | Status: SHIPPED | OUTPATIENT
Start: 2024-08-02

## 2024-08-02 NOTE — TELEPHONE ENCOUNTER
----- Message from Angel Romero sent at 8/2/2024  2:17 PM CDT -----  Contact: Suzie Larsen would like a call back at  [phone] in regards to medication,tirzepatide (MOUNJARO) 2.5 mg/0.5 mL PnIj not being at the pharmacy. Patient contacted the pharmacy twice and she was told they haven't received the prescription and they are needing it resent.   [facility] - [place] - [id number]  [address] [address] [place] [address]  Phone: [phone] Fax: [phone]    Thanks   Am

## 2024-08-09 ENCOUNTER — HOSPITAL ENCOUNTER (OUTPATIENT)
Dept: RADIOLOGY | Facility: HOSPITAL | Age: 44
Discharge: HOME | End: 2024-08-09
Attending: INTERNAL MEDICINE
Payer: COMMERCIAL

## 2024-08-09 VITALS — WEIGHT: 249.13 LBS | BODY MASS INDEX: 45.84 KG/M2 | HEIGHT: 62 IN

## 2024-08-09 DIAGNOSIS — Z12.31 ENCOUNTER FOR SCREENING MAMMOGRAM FOR MALIGNANT NEOPLASM OF BREAST: ICD-10-CM

## 2024-08-09 PROCEDURE — 77067 SCR MAMMO BI INCL CAD: CPT | Mod: TC

## 2025-06-05 DIAGNOSIS — E11.9 TYPE 2 DIABETES MELLITUS WITHOUT COMPLICATION, WITHOUT LONG-TERM CURRENT USE OF INSULIN: ICD-10-CM

## 2025-06-05 RX ORDER — TIRZEPATIDE 2.5 MG/.5ML
INJECTION, SOLUTION SUBCUTANEOUS
Qty: 2 ML | OUTPATIENT
Start: 2025-06-05

## 2025-06-20 ENCOUNTER — LAB VISIT (OUTPATIENT)
Dept: LAB | Facility: HOSPITAL | Age: 45
End: 2025-06-20
Attending: INTERNAL MEDICINE
Payer: COMMERCIAL

## 2025-06-20 ENCOUNTER — OFFICE VISIT (OUTPATIENT)
Dept: FAMILY MEDICINE | Facility: CLINIC | Age: 45
End: 2025-06-20
Payer: COMMERCIAL

## 2025-06-20 VITALS
DIASTOLIC BLOOD PRESSURE: 80 MMHG | HEART RATE: 74 BPM | WEIGHT: 229.25 LBS | SYSTOLIC BLOOD PRESSURE: 118 MMHG | BODY MASS INDEX: 41.94 KG/M2 | TEMPERATURE: 98 F | OXYGEN SATURATION: 98 %

## 2025-06-20 DIAGNOSIS — E11.9 TYPE 2 DIABETES MELLITUS WITHOUT COMPLICATION, WITHOUT LONG-TERM CURRENT USE OF INSULIN: ICD-10-CM

## 2025-06-20 DIAGNOSIS — R53.82 CHRONIC FATIGUE: ICD-10-CM

## 2025-06-20 DIAGNOSIS — Z00.00 ANNUAL PHYSICAL EXAM: ICD-10-CM

## 2025-06-20 DIAGNOSIS — Z00.00 ANNUAL PHYSICAL EXAM: Primary | ICD-10-CM

## 2025-06-20 DIAGNOSIS — Z12.31 ENCOUNTER FOR SCREENING MAMMOGRAM FOR MALIGNANT NEOPLASM OF BREAST: ICD-10-CM

## 2025-06-20 DIAGNOSIS — S03.00XA DISLOCATION OF TEMPOROMANDIBULAR JOINT, INITIAL ENCOUNTER: ICD-10-CM

## 2025-06-20 DIAGNOSIS — Z12.11 SCREEN FOR COLON CANCER: ICD-10-CM

## 2025-06-20 LAB
25(OH)D3+25(OH)D2 SERPL-MCNC: 68 NG/ML (ref 30–96)
ABSOLUTE EOSINOPHIL (OHS): 0.19 K/UL
ABSOLUTE MONOCYTE (OHS): 0.67 K/UL (ref 0.3–1)
ABSOLUTE NEUTROPHIL COUNT (OHS): 7.53 K/UL (ref 1.8–7.7)
ALBUMIN SERPL BCP-MCNC: 3.4 G/DL (ref 3.5–5.2)
ALBUMIN/CREAT UR: 7.3 UG/MG
ALP SERPL-CCNC: 77 UNIT/L (ref 40–150)
ALT SERPL W/O P-5'-P-CCNC: 6 UNIT/L (ref 10–44)
ANION GAP (OHS): 10 MMOL/L (ref 8–16)
AST SERPL-CCNC: 13 UNIT/L (ref 11–45)
BASOPHILS # BLD AUTO: 0.05 K/UL
BASOPHILS NFR BLD AUTO: 0.5 %
BILIRUB SERPL-MCNC: 0.3 MG/DL (ref 0.1–1)
BUN SERPL-MCNC: 11 MG/DL (ref 6–20)
CALCIUM SERPL-MCNC: 8.4 MG/DL (ref 8.7–10.5)
CHLORIDE SERPL-SCNC: 103 MMOL/L (ref 95–110)
CHOLEST SERPL-MCNC: 137 MG/DL (ref 120–199)
CHOLEST/HDLC SERPL: 2.9 {RATIO} (ref 2–5)
CO2 SERPL-SCNC: 24 MMOL/L (ref 23–29)
CREAT SERPL-MCNC: 0.6 MG/DL (ref 0.5–1.4)
CREAT UR-MCNC: 96 MG/DL (ref 15–325)
EAG (OHS): 117 MG/DL (ref 68–131)
ERYTHROCYTE [DISTWIDTH] IN BLOOD BY AUTOMATED COUNT: 19.1 % (ref 11.5–14.5)
GFR SERPLBLD CREATININE-BSD FMLA CKD-EPI: >60 ML/MIN/1.73/M2
GLUCOSE SERPL-MCNC: 75 MG/DL (ref 70–110)
HBA1C MFR BLD: 5.7 % (ref 4–5.6)
HCT VFR BLD AUTO: 31.9 % (ref 37–48.5)
HDLC SERPL-MCNC: 47 MG/DL (ref 40–75)
HDLC SERPL: 34.3 % (ref 20–50)
HGB BLD-MCNC: 9.7 GM/DL (ref 12–16)
IMM GRANULOCYTES # BLD AUTO: 0.04 K/UL (ref 0–0.04)
IMM GRANULOCYTES NFR BLD AUTO: 0.4 % (ref 0–0.5)
LDLC SERPL CALC-MCNC: 79.8 MG/DL (ref 63–159)
LYMPHOCYTES # BLD AUTO: 2.55 K/UL (ref 1–4.8)
MCH RBC QN AUTO: 22.9 PG (ref 27–31)
MCHC RBC AUTO-ENTMCNC: 30.4 G/DL (ref 32–36)
MCV RBC AUTO: 75 FL (ref 82–98)
MICROALBUMIN UR-MCNC: 7 UG/ML (ref ?–5000)
NONHDLC SERPL-MCNC: 90 MG/DL
NUCLEATED RBC (/100WBC) (OHS): 0 /100 WBC
PLATELET # BLD AUTO: 492 K/UL (ref 150–450)
PMV BLD AUTO: 9.4 FL (ref 9.2–12.9)
POTASSIUM SERPL-SCNC: 4.1 MMOL/L (ref 3.5–5.1)
PROT SERPL-MCNC: 7.6 GM/DL (ref 6–8.4)
RBC # BLD AUTO: 4.23 M/UL (ref 4–5.4)
RELATIVE EOSINOPHIL (OHS): 1.7 %
RELATIVE LYMPHOCYTE (OHS): 23.1 % (ref 18–48)
RELATIVE MONOCYTE (OHS): 6.1 % (ref 4–15)
RELATIVE NEUTROPHIL (OHS): 68.2 % (ref 38–73)
SODIUM SERPL-SCNC: 137 MMOL/L (ref 136–145)
TRIGL SERPL-MCNC: 51 MG/DL (ref 30–150)
TSH SERPL-ACNC: 1.2 UIU/ML (ref 0.4–4)
VIT B12 SERPL-MCNC: 680 PG/ML (ref 210–950)
WBC # BLD AUTO: 11.03 K/UL (ref 3.9–12.7)

## 2025-06-20 PROCEDURE — 82306 VITAMIN D 25 HYDROXY: CPT

## 2025-06-20 PROCEDURE — 83525 ASSAY OF INSULIN: CPT

## 2025-06-20 PROCEDURE — 36415 COLL VENOUS BLD VENIPUNCTURE: CPT | Mod: PO

## 2025-06-20 PROCEDURE — 82607 VITAMIN B-12: CPT

## 2025-06-20 PROCEDURE — 83036 HEMOGLOBIN GLYCOSYLATED A1C: CPT

## 2025-06-20 PROCEDURE — 99999 PR PBB SHADOW E&M-EST. PATIENT-LVL IV: CPT | Mod: PBBFAC,,, | Performed by: INTERNAL MEDICINE

## 2025-06-20 PROCEDURE — 85025 COMPLETE CBC W/AUTO DIFF WBC: CPT

## 2025-06-20 PROCEDURE — 80061 LIPID PANEL: CPT

## 2025-06-20 PROCEDURE — 84443 ASSAY THYROID STIM HORMONE: CPT

## 2025-06-20 PROCEDURE — 82570 ASSAY OF URINE CREATININE: CPT | Performed by: INTERNAL MEDICINE

## 2025-06-20 PROCEDURE — 80053 COMPREHEN METABOLIC PANEL: CPT

## 2025-06-20 RX ORDER — PRAVASTATIN SODIUM 10 MG/1
10 TABLET ORAL DAILY
Qty: 90 TABLET | Refills: 3 | Status: SHIPPED | OUTPATIENT
Start: 2025-06-20 | End: 2026-06-20

## 2025-06-20 NOTE — PROGRESS NOTES
Patient ID: Suzie Terrell is a 44 y.o. female.    Chief Complaint: Annual Exam      History of Present Illness    - Ms. Terrell presents for a follow-up visit and reports right ear pain and extreme fatigue.    Ms. Terrell reports right ear pain and extreme fatigue ongoing for at least the past 6 months. The fatigue is severe enough to cause significant difficulty completing her workday, requiring a nap upon returning home. She has difficulty with sleep, typically falling asleep around midnight but waking up around 3:00 AM. She has 1 cup of coffee in the morning on workdays (5 days a week). She checks her glucose levels at home at different times of the day, with results typically in the range of 73-78.    She denies a family history of colon cancer and any blood pressure issues.      ROS:  General: denies fever, denies chills, complains of fatigue, denies weight gain, denies weight loss, complains of difficulty staying asleep, complains of sleep disturbances  Eyes: denies vision changes, denies redness, denies discharge  ENT: complains of ear pain, denies nasal congestion, denies sore throat  Cardiovascular: denies chest pain, denies palpitations, denies lower extremity edema  Respiratory: denies cough, denies shortness of breath  Gastrointestinal: denies abdominal pain, denies nausea, denies vomiting, denies diarrhea, denies constipation, denies blood in stool  Genitourinary: denies dysuria, denies hematuria, denies frequency  Musculoskeletal: denies joint pain, denies muscle pain  Skin: denies rash, denies lesion  Neurological: denies headache, denies dizziness, denies numbness, denies tingling  Psychiatric: denies anxiety, denies depression, denies sleep difficulty            Physical Exam    General: In no acute distress.  Head: Normocephalic. Non traumatic.  Eyes: PERRLA. EOMs full. Conjunctivae clear. Fundi grossly normal.  Ears: EACs clear. TMs normal.  Nose: Mucosa pink. Mucosa moist. No  obstruction.  Throat: Clear. No exudates. No lesions.  Neck: Supple. No masses. No thyromegaly. No bruits.  Chest: Lungs clear. No rales. No rhonchi. No wheezes.  Heart: RRR. No murmurs. No rubs. No gallops.  Abdomen: Soft. No tenderness. No masses. BS normal.  : Normal external genitalia. No lesions. No discharge. No hernias  noted.  Back: Normal curvature. No scoliosis. No tenderness.  Extremities: Warm. Well perfused. No upper extremity edema. No lower extremity edema. FROM. No deformities. No joint erythema.  Neuro: No focal deficits appreciated. Good muscle tone. Normal response to visual stimuli. Normal response to auditory stimuli.  Skin: Normal. No rashes. No lesions noted.          Current Medications[1]            VITAL SIGNS:  Vitals:    06/20/25 1055   BP: 118/80   BP Location: Left arm   Pulse: 74   Temp: 97.6 °F (36.4 °C)   TempSrc: Tympanic   SpO2: 98%   Weight: 104 kg (229 lb 4.5 oz)       CURRENT BMI:   Body mass index is 41.94 kg/m².    LABS REVIEWED:    CBC:  Lab Results   Component Value Date    WBC 6.33 08/07/2023    RBC 4.61 08/07/2023    HGB 11.2 (L) 08/07/2023    HCT 34.2 (L) 08/07/2023    MCV 74 (L) 08/07/2023    MCH 24.3 (L) 08/07/2023    MCHC 32.7 08/07/2023    RDW 16.7 (H) 08/07/2023     08/07/2023    MPV 8.8 (L) 08/07/2023    GRAN 3.1 08/07/2023    GRAN 48.6 08/07/2023    LYMPH 2.4 08/07/2023    LYMPH 38.4 08/07/2023    MONO 0.7 08/07/2023    MONO 10.6 08/07/2023    EOS 0.1 08/07/2023    BASO 0.03 08/07/2023    EOSINOPHIL 1.7 08/07/2023    BASOPHIL 0.5 08/07/2023       CHEMISTRY:  Sodium   Date Value Ref Range Status   08/07/2023 138 136 - 145 mmol/L Final     Potassium   Date Value Ref Range Status   08/07/2023 3.4 (L) 3.5 - 5.1 mmol/L Final     Chloride   Date Value Ref Range Status   08/07/2023 105 95 - 110 mmol/L Final     CO2   Date Value Ref Range Status   08/07/2023 18 (L) 23 - 29 mmol/L Final     Glucose   Date Value Ref Range Status   08/07/2023 89 70 - 110 mg/dL Final  "    BUN   Date Value Ref Range Status   08/07/2023 13 6 - 20 mg/dL Final     Creatinine   Date Value Ref Range Status   08/07/2023 1.0 0.5 - 1.4 mg/dL Final     Calcium   Date Value Ref Range Status   08/07/2023 8.8 8.7 - 10.5 mg/dL Final     Total Protein   Date Value Ref Range Status   08/07/2023 8.2 6.0 - 8.4 g/dL Final     Albumin   Date Value Ref Range Status   08/07/2023 3.8 3.5 - 5.2 g/dL Final     Total Bilirubin   Date Value Ref Range Status   08/07/2023 0.3 0.1 - 1.0 mg/dL Final     Comment:     For infants and newborns, interpretation of results should be based  on gestational age, weight and in agreement with clinical  observations.    Premature Infant recommended reference ranges:  Up to 24 hours.............<8.0 mg/dL  Up to 48 hours............<12.0 mg/dL  3-5 days..................<15.0 mg/dL  6-29 days.................<15.0 mg/dL       Alkaline Phosphatase   Date Value Ref Range Status   08/07/2023 69 55 - 135 U/L Final     AST   Date Value Ref Range Status   08/07/2023 34 10 - 40 U/L Final     ALT   Date Value Ref Range Status   08/07/2023 27 10 - 44 U/L Final     Anion Gap   Date Value Ref Range Status   08/07/2023 15 8 - 16 mmol/L Final     eGFR   Date Value Ref Range Status   08/07/2023 >60.0 >60 mL/min/1.73 m^2 Final       LIPID PANEL:  Lab Results   Component Value Date    CHOL 140 03/11/2020     Lab Results   Component Value Date    TRIG 92 03/11/2020     Lab Results   Component Value Date    HDL 51 03/11/2020     Lab Results   Component Value Date    LDLCALC 71 03/11/2020       THYROID:  Lab Results   Component Value Date    TSH 1.390 03/11/2020       DIABETES:  No results found for: "LABA1C", "HGBA1C"  No results found for: "MICALBCREAT"    Assessment and Plan     Assessment & Plan    TYPE 2 DIABETES MELLITUS:  - Evaluated patient's diabetes management needs.  - Home glucose monitoring shows readings of 73, 78, indicating no hyperglycemia.  - Ordered A1C to assess glycemic control and " determine if Mounjaro dosage adjustment is needed.  - Continued Mounjaro 2.5 mg weekly with potential adjustment pending results.  - Ordered urinalysis for annual renal function screening.  - Prescribed atorvastatin 10 mg daily for cardiovascular protection.  - Educated patient about increased cardiovascular risk in diabetics due to sugar's abrasive effect on blood vessels and discussed the role of statins in preventing complications, emphasizing the approach of starting at a low dose to minimize side effects.  - Referred patient to podiatrist for annual diabetic foot exam.    RIGHT TMJ:  - Noted patient reports pain in the right ear.  - Recommend mouth guard    CHRONIC FATIGUE:  - Monitored patient's extreme fatigue lasting at least 6 months, which is affecting daily activities.  - Evaluated potential causes, ruling out medication side effects and excessive caffeine intake.  - Ordered comprehensive fatigue panel including vitamin D, B12, and thyroid function tests.  - Discussed possible relationships between fatigue and vitamin D deficiency, B12 deficiency, thyroid dysfunction, and diabetes.    PREVENTIVE CARE:  - Ordered annual physical exam, mammogram screening (scheduled for August 9th), and Cologuard for colon cancer screening due to patient recently aging into screening criteria (age 44) and lack of family history.  - Ordered comprehensive lab work including lipid panel.    FOLLOW-UP:  - Instructed patient to follow up to review lab results and to contact the office to schedule appointment with podiatrist.  - Scheduled follow-up to review lab results and assess fatigue causes and medication efficacy.          1. Annual physical exam  Comments:  Will obtain CBC, CMP, TSH, A1c and lipid panel for regular health monitoring  Orders:  -     CBC Auto Differential; Future; Expected date: 06/20/2025  -     Comprehensive Metabolic Panel; Future; Expected date: 06/20/2025  -     Lipid Panel; Future; Expected date:  06/20/2025  -     Hemoglobin A1C; Future; Expected date: 06/20/2025  -     TSH; Future; Expected date: 06/20/2025    2. Type 2 diabetes mellitus without complication, without long-term current use of insulin  Comments:  Continue Mounjaro. Will obtain A1c for medication monitoring. Home BS has been well controlled. Will start low dose statin  Orders:  -     Hemoglobin A1C; Future; Expected date: 06/20/2025  -     TSH; Future; Expected date: 06/20/2025  -     Microalbumin/creatinine urine ratio  -     Ambulatory referral/consult to Podiatry; Future; Expected date: 06/27/2025  -     pravastatin (PRAVACHOL) 10 MG tablet; Take 1 tablet (10 mg total) by mouth once daily.  Dispense: 90 tablet; Refill: 3    3. Encounter for screening mammogram for malignant neoplasm of breast  Comments:  Mammogram ordered  Orders:  -     Mammo Digital Screening Bilat w/ Kevan (XPD); Future; Expected date: 08/09/2025    4. Screen for colon cancer  Comments:  Cologuard ordered  Orders:  -     Cologuard Screening (Multitarget Stool DNA); Future; Expected date: 06/20/2025    5. Chronic fatigue  Comments:  Will obtain Fatigue panel  Orders:  -     Vitamin B12; Future; Expected date: 06/20/2025  -     Insulin, Random; Future; Expected date: 06/20/2025  -     Vitamin D; Future; Expected date: 06/20/2025    6. Dislocation of temporomandibular joint, initial encounter  Comments:  recommend mouth guard to prevent clenching of teeth           Follow up in about 1 year (around 6/20/2026) for In person, Physical, with PCP.      This note was generated with the assistance of ambient listening technology. Verbal consent was obtained by the patient and accompanying visitor(s) for the recording of patient appointment to facilitate this note. I attest to having reviewed and edited the generated note for accuracy, though some syntax or spelling errors may persist. Please contact the author of this note for any clarification.              [1]   Current  Outpatient Medications:     calcium-vitamin D 250 mg-2.5 mcg (100 unit) per tablet, Take 1 tablet by mouth 2 (two) times daily., Disp: , Rfl:     ferrous gluconate (FERGON) 324 MG tablet, Take 324 mg by mouth daily with breakfast., Disp: , Rfl:     PROAIR HFA 90 mcg/actuation inhaler, INL 2 PFS PO Q 6 H PRN, Disp: , Rfl:     tirzepatide (MOUNJARO) 2.5 mg/0.5 mL PnIj, Inject 2.5 mg into the skin every 7 days., Disp: 4 Pen, Rfl: 11    pravastatin (PRAVACHOL) 10 MG tablet, Take 1 tablet (10 mg total) by mouth once daily., Disp: 90 tablet, Rfl: 3

## 2025-06-23 ENCOUNTER — RESULTS FOLLOW-UP (OUTPATIENT)
Dept: FAMILY MEDICINE | Facility: CLINIC | Age: 45
End: 2025-06-23

## 2025-06-23 DIAGNOSIS — D50.9 IRON DEFICIENCY ANEMIA, UNSPECIFIED IRON DEFICIENCY ANEMIA TYPE: Primary | ICD-10-CM

## 2025-06-23 LAB — INSULIN SERPL-ACNC: 6 UU/ML

## 2025-06-27 ENCOUNTER — PATIENT MESSAGE (OUTPATIENT)
Dept: FAMILY MEDICINE | Facility: CLINIC | Age: 45
End: 2025-06-27
Payer: COMMERCIAL

## 2025-06-27 DIAGNOSIS — E11.9 TYPE 2 DIABETES MELLITUS WITHOUT COMPLICATION, WITHOUT LONG-TERM CURRENT USE OF INSULIN: ICD-10-CM

## 2025-06-27 RX ORDER — ALBUTEROL SULFATE 90 UG/1
2 AEROSOL, METERED RESPIRATORY (INHALATION) EVERY 4 HOURS PRN
Qty: 18 G | Refills: 6 | Status: SHIPPED | OUTPATIENT
Start: 2025-06-27

## 2025-06-27 RX ORDER — TIRZEPATIDE 2.5 MG/.5ML
2.5 INJECTION, SOLUTION SUBCUTANEOUS
Qty: 4 PEN | Refills: 11 | Status: SHIPPED | OUTPATIENT
Start: 2025-06-27

## 2025-07-01 ENCOUNTER — PATIENT MESSAGE (OUTPATIENT)
Dept: FAMILY MEDICINE | Facility: CLINIC | Age: 45
End: 2025-07-01
Payer: COMMERCIAL

## 2025-07-02 ENCOUNTER — LAB VISIT (OUTPATIENT)
Dept: LAB | Facility: HOSPITAL | Age: 45
End: 2025-07-02
Attending: INTERNAL MEDICINE
Payer: COMMERCIAL

## 2025-07-02 DIAGNOSIS — D50.9 IRON DEFICIENCY ANEMIA, UNSPECIFIED IRON DEFICIENCY ANEMIA TYPE: ICD-10-CM

## 2025-07-02 LAB
FERRITIN SERPL-MCNC: 9 NG/ML (ref 20–300)
FOLATE SERPL-MCNC: 12.2 NG/ML (ref 4–24)
IRON SATN MFR SERPL: 9 % (ref 20–50)
IRON SERPL-MCNC: 41 UG/DL (ref 30–160)
TIBC SERPL-MCNC: 444 UG/DL (ref 250–450)
TRANSFERRIN SERPL-MCNC: 300 MG/DL (ref 200–375)
VIT B12 SERPL-MCNC: 701 PG/ML (ref 210–950)

## 2025-07-02 PROCEDURE — 82728 ASSAY OF FERRITIN: CPT

## 2025-07-02 PROCEDURE — 82607 VITAMIN B-12: CPT

## 2025-07-02 PROCEDURE — 84466 ASSAY OF TRANSFERRIN: CPT

## 2025-07-02 PROCEDURE — 82746 ASSAY OF FOLIC ACID SERUM: CPT

## 2025-07-02 PROCEDURE — 36415 COLL VENOUS BLD VENIPUNCTURE: CPT | Mod: PO

## 2025-07-03 ENCOUNTER — RESULTS FOLLOW-UP (OUTPATIENT)
Dept: FAMILY MEDICINE | Facility: CLINIC | Age: 45
End: 2025-07-03

## 2025-07-03 ENCOUNTER — TELEPHONE (OUTPATIENT)
Dept: FAMILY MEDICINE | Facility: CLINIC | Age: 45
End: 2025-07-03
Payer: COMMERCIAL

## 2025-07-03 NOTE — TELEPHONE ENCOUNTER
----- Message from Leyda Fonseca DO sent at 7/3/2025  5:57 AM CDT -----  Labs normal. Iron saturation is low but iron levels are normal. Can increase iron rich foods  ----- Message -----  From: Lab, Background User  Sent: 7/2/2025  10:20 PM CDT  To: Leyda Fonseca DO

## 2025-07-17 ENCOUNTER — OFFICE VISIT (OUTPATIENT)
Dept: PODIATRY | Facility: CLINIC | Age: 45
End: 2025-07-17
Payer: COMMERCIAL

## 2025-07-17 VITALS — BODY MASS INDEX: 42.19 KG/M2 | WEIGHT: 229.25 LBS | HEIGHT: 62 IN

## 2025-07-17 DIAGNOSIS — E11.9 COMPREHENSIVE DIABETIC FOOT EXAMINATION, TYPE 2 DM, ENCOUNTER FOR: Primary | ICD-10-CM

## 2025-07-17 DIAGNOSIS — E11.9 TYPE 2 DIABETES MELLITUS WITHOUT COMPLICATION, WITHOUT LONG-TERM CURRENT USE OF INSULIN: ICD-10-CM

## 2025-07-17 PROCEDURE — 3008F BODY MASS INDEX DOCD: CPT | Mod: CPTII,S$GLB,, | Performed by: PODIATRIST

## 2025-07-17 PROCEDURE — 99203 OFFICE O/P NEW LOW 30 MIN: CPT | Mod: S$GLB,,, | Performed by: PODIATRIST

## 2025-07-17 PROCEDURE — 3044F HG A1C LEVEL LT 7.0%: CPT | Mod: CPTII,S$GLB,, | Performed by: PODIATRIST

## 2025-07-17 PROCEDURE — 99999 PR PBB SHADOW E&M-EST. PATIENT-LVL III: CPT | Mod: PBBFAC,,, | Performed by: PODIATRIST

## 2025-07-17 PROCEDURE — 3061F NEG MICROALBUMINURIA REV: CPT | Mod: CPTII,S$GLB,, | Performed by: PODIATRIST

## 2025-07-17 PROCEDURE — 3066F NEPHROPATHY DOC TX: CPT | Mod: CPTII,S$GLB,, | Performed by: PODIATRIST

## 2025-07-17 PROCEDURE — 1159F MED LIST DOCD IN RCRD: CPT | Mod: CPTII,S$GLB,, | Performed by: PODIATRIST

## 2025-07-17 NOTE — PROGRESS NOTES
[facility]  PODIATRIC MEDICINE AND SURGERY      CHIEF COMPLAINT  Chief Complaint   Patient presents with    Diabetic Foot Exam     DFE. 0/10 pain. Pt wears tennis shoes and socks. Pt last visit with Dr Leyda Fonseca was 6/20/2025.         HPI    SUBJECTIVE: Suzie Terrell is a 44 y.o. female who  has a past medical history of Anxiety and Arthritis. Senanpresents to clinic for high risk diabetic foot exam and care.  Suzie denies numbness, burning, and/or tingling sensations in their feet. Patient relates blood sugars normally run around 120. Pt has established care with primary care physician and would like to establish care with a podiatric physician. Patient has no other pedal complaints at this time      HgA1c:   Hemoglobin A1c   Date Value Ref Range Status   06/20/2025 5.7 (H) 4.0 - 5.6 % Final     Comment:     ADA Screening Guidelines:  5.7-6.4%  Consistent with prediabetes  >=6.5%  Consistent with diabetes    High levels of fetal hemoglobin interfere with the HbA1C  assay. Heterozygous hemoglobin variants (HbS, HgC, etc)do  not significantly interfere with this assay.   However, presence of multiple variants may affect accuracy.         PMH  Past Medical History:   Diagnosis Date    Anxiety     Arthritis        MEDS  Medications Ordered Prior to Encounter[1]    PSH     Past Surgical History:   Procedure Laterality Date    BONE CYST EXCISION  left wrist        ALL  Review of patient's allergies indicates:  No Known Allergies    SOC   Social History[2]      Family HX    Family History   Problem Relation Name Age of Onset    Cancer Mother      Diabetes type II Father      Hypertension Father      Cancer Sister              REVIEW OF SYSTEMS  General: Denies any fever or chills  Chest: Denies shortness of breath, wheezing, coughing, or sputum production  Heart: Denies chest pain.  As noted above and per history of current illness above, otherwise negative in the remainder of the 14  "systems.     PHYSICAL EXAM  Vitals:    07/17/25 1429   Weight: 104 kg (229 lb 4.5 oz)   Height: 5' 2" (1.575 m)   PainSc: 0-No pain       GEN:  This patient is well-developed, well-nourished and appears stated age, well-oriented to person, place and time, and cooperative and pleasant on today's visit.      LOWER EXTREMITY PHYSICAL EXAMINATION   VASCULAR  DP pedal pulse 2/4 RIGHT, LEFT2/4   PT pedal pulse 2/4 RIGHT, LEFT2/4  Capillary refill time immediate to the toes.   Feet are warm to the touch. Skin temperature warm to warm from proximally to distally   There are no varicosities, telangiectasias noted to bilateral foot and ankle regions.   There are no ecchymoses noted to bilateral foot and ankle regions.   There is no gross lower extremity edema.    DERMATOLOGIC  Skin moist with healthy texture and turgor.  There are no open ulcerations, lacerations, or fissures to bilateral foot and ankle regions. There are no signs of infection as there is no erythema, no proximal-extending lymphangiitis, no fluctuance, or crepitus noted on palpation to bilateral foot and ankle regions.   There is no interdigital maceration.   There are no hyperkeratotic lesions noted to feet. Nails are well-trimmed.    NEUROLOGIC  Protective sensation intact at 10/10 sites upon examination with [place] Weinsten 5.07 g monofilament.  Propioception intact at 1st MTPJ b/l.  Achilles and patellar deep tendon reflexes intact  Babinski reflex absent    ORTHOPEDIC/BIOMECHANICAL  No symptomatic structural abnormalities noted. Muscle strength is 5/5 for foot inverters, everters, plantarflexors, and dorsiflexors. Muscle tone is normal.  Inspection/palpation of bone, joints and muscles unremarkable.      ASSESSMENT  Encounter Diagnoses   Name Primary?    Type 2 diabetes mellitus without complication, without long-term current use of insulin     Comprehensive diabetic foot examination, type 2 DM, encounter for Yes         Plan:  -Initial patient " evaluation with H&P was performed  -Discuss presenting problems, etiology, pathologic processes and management options with patient today.   -I counseled the patient on their conditions, their implications and medical management. An in depth discussion on diabetic management, risk prevention, amputation prevention verbally and provided educational literature in written format.  - Shoe inspection. Diabetic Foot Education. Patient reminded of the importance of good nutrition and blood sugar control to help prevent podiatric complications of diabetes. Patient instructed on proper foot hygeine. We discussed wearing proper shoe gear, daily foot inspections, never walking without protective shoe gear, never putting sharp instruments to feet, routine podiatric visits annually/semi annually or sooner if symptoms arise    Disclaimer: This note was partially prepared using a voice recognition system and is likely to have sound alike errors within the text.        Future Appointments   Date Time Provider Department Center   8/11/2025  1:40 PM [facility] [place]       Report Electronically Signed By:     Vania Dewitt DPM   Podiatry  [facility]   7/24/2025                [1]   Current Outpatient Medications on File Prior to Visit   Medication Sig Dispense Refill    albuterol (VENTOLIN HFA) 90 mcg/actuation inhaler Inhale 2 puffs into the lungs every 6 (six) hours as needed for Wheezing. Rescue 18 g 0    calcium-vitamin D 250 mg-2.5 mcg (100 unit) per tablet Take 1 tablet by mouth 2 (two) times daily.      ferrous gluconate (FERGON) 324 MG tablet Take 324 mg by mouth daily with breakfast.      pravastatin (PRAVACHOL) 10 MG tablet Take 1 tablet (10 mg total) by mouth once daily. 90 tablet 3    tirzepatide (MOUNJARO) 2.5 mg/0.5 mL PnIj Inject 2.5 mg into the skin every 7 days. 4 Pen 11     No current facility-administered medications on file prior to visit.   [2]   Social History  Tobacco  Use    Smoking status: Some Days    Smokeless tobacco: Never   Substance Use Topics    Alcohol use: No    Drug use: No

## 2025-08-07 DIAGNOSIS — E11.9 TYPE 2 DIABETES MELLITUS WITHOUT COMPLICATION, WITHOUT LONG-TERM CURRENT USE OF INSULIN: ICD-10-CM

## 2025-08-07 RX ORDER — TIRZEPATIDE 2.5 MG/.5ML
INJECTION, SOLUTION SUBCUTANEOUS
Qty: 6 ML | Refills: 1 | Status: SHIPPED | OUTPATIENT
Start: 2025-08-07

## 2025-08-07 NOTE — TELEPHONE ENCOUNTER
Refill Decision Note   Suzie Terrell  is requesting a refill authorization.  Brief Assessment and Rationale for Refill:  Approve     Medication Therapy Plan:         Comments:     Note composed:11:20 AM 08/07/2025

## 2025-08-15 ENCOUNTER — HOSPITAL ENCOUNTER (OUTPATIENT)
Dept: RADIOLOGY | Facility: HOSPITAL | Age: 45
Discharge: HOME | End: 2025-08-15
Attending: INTERNAL MEDICINE
Payer: COMMERCIAL

## 2025-08-15 VITALS — BODY MASS INDEX: 42.19 KG/M2 | WEIGHT: 229.25 LBS | HEIGHT: 62 IN

## 2025-08-15 DIAGNOSIS — Z12.31 ENCOUNTER FOR SCREENING MAMMOGRAM FOR MALIGNANT NEOPLASM OF BREAST: ICD-10-CM

## 2025-08-15 PROCEDURE — 77067 SCR MAMMO BI INCL CAD: CPT | Mod: 26,,, | Performed by: RADIOLOGY

## 2025-08-15 PROCEDURE — 77063 BREAST TOMOSYNTHESIS BI: CPT | Mod: TC

## 2025-08-15 PROCEDURE — 77063 BREAST TOMOSYNTHESIS BI: CPT | Mod: 26,,, | Performed by: RADIOLOGY
